# Patient Record
(demographics unavailable — no encounter records)

---

## 2024-10-30 NOTE — PHYSICAL EXAM
[Exposed Vessel] : left anterior vessel not exposed [Surgically Absent] : surgically absent [Clear to Auscultation] : lungs were clear to auscultation bilaterally [Wheezing] : no wheezing [Increased Work of Breathing] : no increased work of breathing with use of accessory muscles and retractions [Normal Gait and Station] : normal gait and station [Normal muscle strength, symmetry and tone of facial, head and neck musculature] : normal muscle strength, symmetry and tone of facial, head and neck musculature [Normal] : no cervical lymphadenopathy [Age Appropriate Behavior] : age appropriate behavior [Cooperative] : cooperative [de-identified] : Bilateral tonsillar fossa well healed.

## 2024-10-30 NOTE — HISTORY OF PRESENT ILLNESS
[de-identified] : Varun Kay is a 8 yo male who was referred by Dr. Lockwood for evaluation of recurrent tonsillitis. He has had seven episodes of strep tonsillitis in the past year. He has not had a peritonsillar abscess. HIs mother notes snoring but denies witnessed apnea episodes. He does have nighttime awakenings. he is currently on augmentin  for tonsillitis. He has nasal congestion and chronic mouth breathing. No history of recurrent ear infections.  [de-identified] : 10/30/24-  Varun presents s/p tonsillectomy and adenoidectomy on 9/30/24. He did well postop. No bleeding episodes or fevers. He is back to regular diet and activity. His mother denies snoring.

## 2024-10-30 NOTE — ASSESSMENT
[FreeTextEntry1] : Varnu Kay presents for follow-up. He is s/p tonsillectomy and adenoidectomy on 9/30/24. He did well postop. Bilateral tonsillar fossa are well healed.  - f/u prn

## 2024-10-30 NOTE — DATA REVIEWED
[FreeTextEntry1] : Pathology 10/30/24: Specimen(s) Submitted 1. Bilateral tonsils  Final Diagnosis Tonsils, Bilateral tonsillectomy -  Tonsils x 2 (gross examination only)